# Patient Record
Sex: FEMALE | Race: OTHER | Employment: UNEMPLOYED | URBAN - METROPOLITAN AREA
[De-identification: names, ages, dates, MRNs, and addresses within clinical notes are randomized per-mention and may not be internally consistent; named-entity substitution may affect disease eponyms.]

---

## 2018-03-05 ENCOUNTER — HOSPITAL ENCOUNTER (EMERGENCY)
Age: 28
Discharge: HOME | End: 2018-03-06
Attending: EMERGENCY MEDICINE
Payer: COMMERCIAL

## 2018-03-05 VITALS
RESPIRATION RATE: 16 BRPM | HEIGHT: 64 IN | SYSTOLIC BLOOD PRESSURE: 129 MMHG | DIASTOLIC BLOOD PRESSURE: 67 MMHG | BODY MASS INDEX: 18.44 KG/M2 | HEART RATE: 88 BPM | OXYGEN SATURATION: 97 % | WEIGHT: 108 LBS | TEMPERATURE: 99.1 F

## 2018-03-05 DIAGNOSIS — J02.9 ACUTE PHARYNGITIS, UNSPECIFIED ETIOLOGY: Primary | ICD-10-CM

## 2018-03-05 DIAGNOSIS — B34.9 VIRAL ILLNESS: ICD-10-CM

## 2018-03-05 LAB
DIRECT EXAM: NORMAL
Lab: NORMAL
Lab: NORMAL
SPECIMEN DESCRIPTION: NORMAL
SPECIMEN DESCRIPTION: NORMAL
STATUS: NORMAL
STATUS: NORMAL

## 2018-03-05 PROCEDURE — 99283 EMERGENCY DEPT VISIT LOW MDM: CPT

## 2018-03-05 PROCEDURE — 87880 STREP A ASSAY W/OPTIC: CPT

## 2018-03-05 PROCEDURE — 87651 STREP A DNA AMP PROBE: CPT

## 2018-03-05 PROCEDURE — 87804 INFLUENZA ASSAY W/OPTIC: CPT

## 2018-03-05 PROCEDURE — 6370000000 HC RX 637 (ALT 250 FOR IP): Performed by: EMERGENCY MEDICINE

## 2018-03-05 RX ORDER — IBUPROFEN 600 MG/1
600 TABLET ORAL ONCE
Status: COMPLETED | OUTPATIENT
Start: 2018-03-05 | End: 2018-03-05

## 2018-03-05 RX ADMIN — IBUPROFEN 600 MG: 600 TABLET, FILM COATED ORAL at 23:41

## 2018-03-05 ASSESSMENT — PAIN SCALES - GENERAL
PAINLEVEL_OUTOF10: 10
PAINLEVEL_OUTOF10: 10

## 2018-03-05 ASSESSMENT — PAIN DESCRIPTION - PAIN TYPE: TYPE: ACUTE PAIN

## 2018-03-05 ASSESSMENT — ENCOUNTER SYMPTOMS
RESPIRATORY NEGATIVE: 1
EYES NEGATIVE: 1
GASTROINTESTINAL NEGATIVE: 1

## 2018-03-05 ASSESSMENT — PAIN DESCRIPTION - LOCATION: LOCATION: THROAT

## 2018-03-06 LAB
DIRECT EXAM: NORMAL
DIRECT EXAM: NORMAL
Lab: NORMAL
Lab: NORMAL
SPECIMEN DESCRIPTION: NORMAL
SPECIMEN DESCRIPTION: NORMAL
STATUS: NORMAL

## 2018-03-06 NOTE — ED PROVIDER NOTES
Mouth/Throat: Oropharynx is clear and moist. No oropharyngeal exudate. Eyes: EOM are normal. Pupils are equal, round, and reactive to light. Neck: Neck supple. Cardiovascular: Normal rate and regular rhythm. Pulmonary/Chest: Effort normal and breath sounds normal.   Abdominal: Soft. Bowel sounds are normal.   Musculoskeletal: Normal range of motion. Lymphadenopathy:     She has no cervical adenopathy. Neurological: She is alert and oriented to person, place, and time. Skin: Skin is warm and dry. Capillary refill takes 2 to 3 seconds. Psychiatric: She has a normal mood and affect. Vitals reviewed. DIFFERENTIAL DIAGNOSIS/ MDM:     Possible influenza versus other viral etiology pharyngitis. Patient will get a flu swab. DIAGNOSTIC RESULTS     EKG: All EKG's are interpreted by the Emergency Department Physician who either signs or Co-signs this chart in the absence of a cardiologist.        Not indicated unless otherwise documented above    LABS:  Results for orders placed or performed during the hospital encounter of 03/05/18   Strep Screen Group A Throat   Result Value Ref Range    Specimen Description . THROAT SWAB     Special Requests NOT REPORTED     Direct Exam       Rapid Strep A negative. A negative Rapid Group A Strep Screen result does not    Direct Exam        rule out the possibility of Group A Streptococci in the specimen. A Group A    Direct Exam  strep DNA test will be performed. Direct Exam       Performed at [facility] and [address] [facility], [address] [place] [address] 36.     Status FINAL 03/05/2018    Rapid Influenza A/B Antigens   Result Value Ref Range    Specimen Description . NASOPHARYNGEAL SWAB     Special Requests NOT REPORTED     Direct Exam PRESUMPTIVE NEGATIVE for Influenza A + B antigens.      Direct Exam       PCR testing to confirm this result is available upon request.  Specimen will be    Direct Exam        saved in

## 2019-04-17 ENCOUNTER — HOSPITAL ENCOUNTER (OUTPATIENT)
Age: 29
Discharge: HOME | End: 2019-04-17
Payer: COMMERCIAL

## 2019-04-17 LAB
ANGIOTENSIN-CONVERTING ENZYME: 39 U/L (ref 8–52)
FIBRINOGEN: 305 MG/DL (ref 170–400)
RHEUMATOID FACTOR: <10 IU/ML
SEDIMENTATION RATE, ERYTHROCYTE: 5 MM (ref 0–20)

## 2019-04-17 PROCEDURE — 82164 ANGIOTENSIN I ENZYME TEST: CPT

## 2019-04-17 PROCEDURE — 85306 CLOT INHIBIT PROT S FREE: CPT

## 2019-04-17 PROCEDURE — 36415 COLL VENOUS BLD VENIPUNCTURE: CPT

## 2019-04-17 PROCEDURE — 81240 F2 GENE: CPT

## 2019-04-17 PROCEDURE — 86146 BETA-2 GLYCOPROTEIN ANTIBODY: CPT

## 2019-04-17 PROCEDURE — 85384 FIBRINOGEN ACTIVITY: CPT

## 2019-04-17 PROCEDURE — 86038 ANTINUCLEAR ANTIBODIES: CPT

## 2019-04-17 PROCEDURE — 85610 PROTHROMBIN TIME: CPT

## 2019-04-17 PROCEDURE — 85613 RUSSELL VIPER VENOM DILUTED: CPT

## 2019-04-17 PROCEDURE — 81105 HPA-1 GENOTYPING: CPT

## 2019-04-17 PROCEDURE — 81241 F5 GENE: CPT

## 2019-04-17 PROCEDURE — 86148 ANTI-PHOSPHOLIPID ANTIBODY: CPT

## 2019-04-17 PROCEDURE — 86431 RHEUMATOID FACTOR QUANT: CPT

## 2019-04-17 PROCEDURE — 85290 CLOT FACTOR XIII FIBRIN STAB: CPT

## 2019-04-17 PROCEDURE — 85651 RBC SED RATE NONAUTOMATED: CPT

## 2019-04-17 PROCEDURE — 85300 ANTITHROMBIN III ACTIVITY: CPT

## 2019-04-17 PROCEDURE — 85730 THROMBOPLASTIN TIME PARTIAL: CPT

## 2019-04-17 PROCEDURE — 85303 CLOT INHIBIT PROT C ACTIVITY: CPT

## 2019-04-17 PROCEDURE — 81400 MOPATH PROCEDURE LEVEL 1: CPT

## 2019-04-17 PROCEDURE — 86147 CARDIOLIPIN ANTIBODY EA IG: CPT

## 2019-04-18 LAB — ANTI-NUCLEAR ANTIBODY (ANA): NEGATIVE

## 2019-04-19 LAB
ANTICARDIOLIPIN IGA ANTIBODY: 3.3 APU
ANTICARDIOLIPIN IGG ANTIBODY: 4.4 GPU
AT-III ACTIVITY: 94 % (ref 83–122)
CARDIOLIPIN AB IGM: 2 MPU
DILUTE RUSSELL VIPER VENOM TIME: NORMAL
INR BLD: 1
LUPUS ANTICOAG: NORMAL
PARTIAL THROMBOPLASTIN TIME: 28 SEC (ref 23–31)
PROTEIN C ACTIVITY: 95 %
PROTEIN S ACTIVITY: 78 % (ref 59–130)
PROTHROMBIN TIME: 10.6 SEC (ref 9.7–11.6)

## 2019-04-20 LAB
ANTI B2-GLYCOPROTEIN IGG: 1 SGU (ref 0–20)
ANTI B2-GLYCOPROTEIN IGM: 0 SMU (ref 0–20)
ANTIPHOSPHATIDYLSERINE IGA ANTIBODY: 2 U/ML (ref 0–19)
ANTIPHOSPHATIDYLSERINE IGG ANTIBODY: 5 U/ML (ref 0–10)
ANTIPHOSPHATIDYLSERINE IGM ANTIBODY: 6 U/ML (ref 0–24)

## 2019-04-23 LAB
FACTOR XIII ACTIVITY: 99 % (ref 69–143)
PAI-1 INTERPRETATION: NORMAL
PLASMINOGEN ACT. INHIBITOR-1 (PAI-1) SPECIMEN: NORMAL

## 2019-04-25 LAB
FACTOR V LEIDEN MUTATION: NORMAL
PROTHROMBIN G20210A MUTATION: NORMAL

## 2019-04-28 LAB
SEND OUT REPORT: NORMAL
TEST NAME: NORMAL